# Patient Record
Sex: MALE | Race: WHITE | NOT HISPANIC OR LATINO | Employment: UNEMPLOYED | URBAN - METROPOLITAN AREA
[De-identification: names, ages, dates, MRNs, and addresses within clinical notes are randomized per-mention and may not be internally consistent; named-entity substitution may affect disease eponyms.]

---
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
## 2019-01-08 ENCOUNTER — RECORDS - HEALTHEAST (OUTPATIENT)
Dept: ADMINISTRATIVE | Facility: OTHER | Age: 19
End: 2019-01-08

## 2019-07-26 ENCOUNTER — AMBULATORY - HEALTHEAST (OUTPATIENT)
Dept: LAB | Facility: CLINIC | Age: 19
End: 2019-07-26

## 2019-07-26 DIAGNOSIS — Z79.899 ENCOUNTER FOR LONG-TERM (CURRENT) USE OF MEDICATIONS: ICD-10-CM

## 2019-07-26 DIAGNOSIS — G40.019 LOCALIZATION-RELATED (FOCAL) (PARTIAL) IDIOPATHIC EPILEPSY AND EPILEPTIC SYNDROMES WITH SEIZURES OF LOCALIZED ONSET, INTRACTABLE, WITHOUT STATUS EPILEPTICUS (H): ICD-10-CM

## 2019-08-27 ENCOUNTER — COMMUNICATION - HEALTHEAST (OUTPATIENT)
Dept: SCHEDULING | Facility: CLINIC | Age: 19
End: 2019-08-27

## 2019-08-28 ENCOUNTER — RECORDS - HEALTHEAST (OUTPATIENT)
Dept: ADMINISTRATIVE | Facility: OTHER | Age: 19
End: 2019-08-28

## 2019-08-29 ENCOUNTER — COMMUNICATION - HEALTHEAST (OUTPATIENT)
Dept: FAMILY MEDICINE | Facility: CLINIC | Age: 19
End: 2019-08-29

## 2019-09-05 ENCOUNTER — COMMUNICATION - HEALTHEAST (OUTPATIENT)
Dept: FAMILY MEDICINE | Facility: CLINIC | Age: 19
End: 2019-09-05

## 2019-09-09 ENCOUNTER — COMMUNICATION - HEALTHEAST (OUTPATIENT)
Dept: FAMILY MEDICINE | Facility: CLINIC | Age: 19
End: 2019-09-09

## 2019-09-12 ENCOUNTER — COMMUNICATION - HEALTHEAST (OUTPATIENT)
Dept: FAMILY MEDICINE | Facility: CLINIC | Age: 19
End: 2019-09-12

## 2020-03-23 ENCOUNTER — RECORDS - HEALTHEAST (OUTPATIENT)
Dept: ADMINISTRATIVE | Facility: OTHER | Age: 20
End: 2020-03-23

## 2020-03-23 ENCOUNTER — AMBULATORY - HEALTHEAST (OUTPATIENT)
Dept: LAB | Facility: CLINIC | Age: 20
End: 2020-03-23

## 2020-03-23 DIAGNOSIS — Z79.899 ENCOUNTER FOR LONG-TERM (CURRENT) USE OF MEDICATIONS: ICD-10-CM

## 2020-03-23 DIAGNOSIS — G40.019 LOCALIZATION-RELATED IDIOPATHIC EPILEPSY AND EPILEPTIC SYNDROMES WITH SEIZURES OF LOCALIZED ONSET, INTRACTABLE, WITHOUT STATUS EPILEPTICUS (H): ICD-10-CM

## 2020-05-21 ENCOUNTER — OFFICE VISIT - HEALTHEAST (OUTPATIENT)
Dept: FAMILY MEDICINE | Facility: CLINIC | Age: 20
End: 2020-05-21

## 2020-05-21 DIAGNOSIS — Z00.00 HEALTH CARE MAINTENANCE: ICD-10-CM

## 2020-05-21 DIAGNOSIS — F84.0 AUTISM: ICD-10-CM

## 2020-05-21 DIAGNOSIS — R56.9 SEIZURES (H): ICD-10-CM

## 2020-05-21 ASSESSMENT — PATIENT HEALTH QUESTIONNAIRE - PHQ9: SUM OF ALL RESPONSES TO PHQ QUESTIONS 1-9: 0

## 2020-05-22 ENCOUNTER — COMMUNICATION - HEALTHEAST (OUTPATIENT)
Dept: FAMILY MEDICINE | Facility: CLINIC | Age: 20
End: 2020-05-22

## 2020-08-31 ENCOUNTER — COMMUNICATION - HEALTHEAST (OUTPATIENT)
Dept: FAMILY MEDICINE | Facility: CLINIC | Age: 20
End: 2020-08-31

## 2020-09-01 ENCOUNTER — COMMUNICATION - HEALTHEAST (OUTPATIENT)
Dept: FAMILY MEDICINE | Facility: CLINIC | Age: 20
End: 2020-09-01

## 2020-09-23 ENCOUNTER — COMMUNICATION - HEALTHEAST (OUTPATIENT)
Dept: FAMILY MEDICINE | Facility: CLINIC | Age: 20
End: 2020-09-23

## 2020-09-24 ENCOUNTER — RECORDS - HEALTHEAST (OUTPATIENT)
Dept: ADMINISTRATIVE | Facility: OTHER | Age: 20
End: 2020-09-24

## 2021-01-28 ENCOUNTER — COMMUNICATION - HEALTHEAST (OUTPATIENT)
Dept: FAMILY MEDICINE | Facility: CLINIC | Age: 21
End: 2021-01-28

## 2021-02-10 ENCOUNTER — COMMUNICATION - HEALTHEAST (OUTPATIENT)
Dept: FAMILY MEDICINE | Facility: CLINIC | Age: 21
End: 2021-02-10

## 2021-05-27 ASSESSMENT — PATIENT HEALTH QUESTIONNAIRE - PHQ9: SUM OF ALL RESPONSES TO PHQ QUESTIONS 1-9: 0

## 2021-05-30 ENCOUNTER — HEALTH MAINTENANCE LETTER (OUTPATIENT)
Age: 21
End: 2021-05-30

## 2021-05-30 VITALS — HEIGHT: 66 IN | WEIGHT: 125 LBS | BODY MASS INDEX: 20.09 KG/M2

## 2021-05-31 VITALS — WEIGHT: 121 LBS | HEIGHT: 66 IN | BODY MASS INDEX: 19.44 KG/M2

## 2021-05-31 NOTE — TELEPHONE ENCOUNTER
"Triage call:     Consent on file- with patient at home    Yesterday patient had a large grand mal seizure that mom reports was bigger than any seizure that patient has had before. She reports that when she got to him he was \"a little blue\". Happened around 11 am yesterday.      Last night patient spiked a fever and started coughing- dry. Mom states that she only measured temp by touch due to his autism and concerns he would break the thermometer. Mom states that fever is continued today. Mom has also noted Congested in his nose- using saline spray. She has also noted that he is very thirsty today. Unsure if patient has a headache.     Mom reports that this is the first that patient has gotten up since the seizure yesterday- they slept most of the day.     Triaged to be seen in the office today- no appointments available with PCP till tomorrow. PCP please advise- is this something that patient should wait until tomorrow for or something that he should be seen for more urgently? Please advise.     Appointment scheduled for tomorrow @ 1:30 with PCP.     Sussy Hale RN [facility] Care Connection Triage/Med Refill 8/27/2019 10:58 AM    Reason for Disposition    Patient wants to be seen    Protocols used: [id number]      "

## 2021-06-01 VITALS — WEIGHT: 138 LBS | HEIGHT: 66 IN | BODY MASS INDEX: 22.18 KG/M2

## 2021-06-01 NOTE — TELEPHONE ENCOUNTER
Name of form/paperwork: Other:  request to administer medication  Have you been seen for this request: Yes:  in chart  Do we have the form: Yes- 9-5-2019  When is form needed by: ASAP  How would you like the form returned: just file in my chart  Fax Number: [phone]  Patient Notified form requests are processed in 3-5 business days: Yes  (If patient needs form sooner, please note that in this message.)  Okay to leave a detailed message? Yes    Form placed in purple [employer] bin    Please fax to [phone], then file in chart

## 2021-06-01 NOTE — TELEPHONE ENCOUNTER
Who is requesting the letter?  Patient's motherMaryjane  Why is the letter needed?   Patient's mother states patient needs a letter stating exact dosage of Diastat Acudial to administer.  Patient's mother states the letter should state: 3.5 mg after three minute seizure.  How would you like this letter returned? Please fax to [facility], [phone]  Okay to leave a detailed message? Yes

## 2021-06-08 NOTE — TELEPHONE ENCOUNTER
Left message to call back for: Forms   Information to relay to patient:  Left message forms are complete. Would they like forms faxed? If so needing a fax #, and where we are sending. Otherwise can  at clinic or mail.    Copies made and sent to scanning.

## 2021-06-08 NOTE — PROGRESS NOTES
"Jg Jordan is a 19 y.o. male who is being evaluated via a billable video visit.      The patient has been notified of following:     \"This video visit will be conducted via a call between you and your physician/provider. We have found that certain health care needs can be provided without the need for an in-person physical exam.  This service lets us provide the care you need with a video conversation.  If a prescription is necessary we can send it directly to your pharmacy.  If lab work is needed we can place an order for that and you can then stop by our lab to have the test done at a later time.    Video visits are billed at different rates depending on your insurance coverage. Please reach out to your insurance provider with any questions.    If during the course of the call the physician/provider feels a video visit is not appropriate, you will not be charged for this service.\"    Patient has given verbal consent to a Video visit? Yes    Patient would like to receive their AVS by AVS Preference: Nini.    Patient would like the video invitation sent by: Send to e-mail at: [email or web address]  Will anyone else be joining your video visit? No        Video Start Time: 1115 am        Video-Visit Details  ASSESSMENT/PLAN  1. Health care maintenance  Patient doing well overall is in need of forms for new day program he will be starting    2. Autism  No changes to medication he continues to follow with neurology    3. Seizures (H)  Has been doing well and continues to follow with neurology no medication changes        SUBJECTIVE:   Chief Complaint   Patient presents with     Forms Request     day program forms      Jg Jordan 19 y.o. male    Current Outpatient Medications   Medication Sig Dispense Refill     diazePAM (DIASTAT ACUDIAL) 5-7.5-10 mg Kit        guanFACINE (TENEX) 1 MG tablet Take 1 mg by mouth 2 (two) times a day.        melatonin 3 mg Tab tablet Take 3 mg by mouth bedtime as needed. "       sertraline (ZOLOFT) 100 MG tablet Take 200 mg by mouth daily. AM only       topiramate (TOPAMAX) 25 MG tablet Take 25 mg by mouth.        traZODone (DESYREL) 100 MG tablet Take 100 mg by mouth bedtime.       UNABLE TO FIND Med Name:   Michelle Araya  .47% CBD  .45% THZ  Take 10mL in AM and 10 mL in PM       No current facility-administered medications for this visit.      Allergies: Patient has no known allergies.   No LMP for male patient.    HPI:   Patient seen virtually today due to COVID pandemic for forms to be filled out for his day program.  We will fill out forms and fax for patient.  They have no acute concerns.  Patient has been self isolating well with his family at home.  Patient has a neurologist that follows with him for his history of seizures and autism.  There is been no recent medication changes.  We will update forms and send to his new facility where he will be starting in the upcoming month.    ROS: negative except as per HPI    OBJECTIVE:   The patient appears well, alert  There were no vitals taken for this visit.  GENERAL: Healthy, alert and no distress  EYES: Eyes grossly normal to inspection. No discharge or erythema, or obvious scleral/conjunctival abnormalities.  RESP: No audible wheeze, cough, or visible cyanosis.  No visible retractions or increased work of breathing.    NEURO: Cranial nerves grossly intact.    Pt states an understanding and agrees to the above plan.    Type of service:  Video Visit    Video End Time (time video stopped): 11:23 AM  Originating Location (pt. Location): Home    Distant Location (provider location):  [facility] MEDICINE/OB     Platform used for Video Visit: Murali Rousseau MD

## 2021-06-10 NOTE — PROGRESS NOTES
SUBJECTIVE:   Chief Complaint   Patient presents with     Weight Loss     Decrease in appetite, behavior change, weight loss     Jg TORRES lucia 16 y.o. male    Current Outpatient Prescriptions   Medication Sig Dispense Refill     diazepam (DIASTAT ACUDIAL) 5-7.5-10 mg Kit Insert into the rectum as needed.       guanFACINE (TENEX) 1 MG tablet Take 1 mg by mouth 2 (two) times a day.        melatonin 3 mg Tab tablet Take 3 mg by mouth bedtime as needed.       sertraline (ZOLOFT) 100 MG tablet Take 150 mg by mouth daily. AM only       traZODone (DESYREL) 100 MG tablet Take 100 mg by mouth bedtime.       UNABLE TO FIND Med Name:   Michelle Oil  .47% CBD  .45% THZ  Take 10mL in AM and 10 mL in PM       UNABLE TO FIND as needed. Med Name: Tangerine Sublingual spray   100% pure cannabis oil - THC Dominant       No current facility-administered medications for this visit.      Allergies: Review of patient's allergies indicates no known allergies.   No LMP for male patient.    HPI:   Patient presents today with his mother.  Their concerns as he has had some decrease in appetite and eating habits over the last couple months.  They have also noticed in the last month some behavioral change.  He has been acting normal slightly more aggressive which is atypical for him in the last month.  To note in review with mom there is been some titration off medications for his seizures in the last few months including the discontinuation of both topiramate and lamotrigine.  He is on these medications for seizure prophylaxis and has not had a seizure since October-he is currently also taking oral suspension medical marijuana through the [facility] and this is improved his seizure threshold and frequency greatly.  We did have discussion with mom today that the removal of these medications however could be leading to the change of his behavior as lamotrigine is also used for behavioral reasons not just seizures.  She does after discussion  "think that the time he does correlate with removal of his medications and some of the behavioral changes.    He has however down 6-7 pounds in comparison to a few months ago and she has noticed him to be eating a little bit less.  We had a discussion today over food journaling over the next couple weeks to get me information to see what his caloric intake actually has an following up based on this model.  If she continues to see behavioral changes consideration for discussing with the neurologist about the removal of the lamotrigine and behavioral changes.    ROS: negative except as per HPI    OBJECTIVE:   The patient appears well, alert, oriented x 3, in no distress.  BP 94/56 (Patient Site: Right Arm, Patient Position: Sitting, Cuff Size: Adult Regular)  Pulse 88  Temp 98  F (36.7  C) (Oral)   Resp 18  Ht 5' 6\" (1.676 m)  Wt 125 lb (56.7 kg)  BMI 20.18 kg/m2    Cardiac: S1 and S2 normal, no murmurs, regular rate and rhythm.   Neurological: normal, no focal findings.  Skin: clear, dry, no rashes/lesions  Psych- normal mood and affect- sitting and watching phone throughout inerview      ASSESSMENT/PLAN  1. Weight loss  Food journaling have a mild drop off information to me so I can assess caloric intake to see if this correlates with weight loss  Follow based on food journaling    2. Behavioral change  Possible removal of seizure medications let us behavioral changes  Has not been something on a regular basis but some that she wanted to bring up and discuss  We will continue to monitor at this time and she will update me in a few weeks      Pt states an understanding and agrees to the above plan.    "

## 2021-06-12 NOTE — PROGRESS NOTES
ASSESSMENT AND PLAN:  COUGH - TX WITH OLGA, ISOLATE COUGH FROM OTHERS. Concern for pertussis, but declined pertussis swab today as it would be very traumatic given the he is nonverbal and developmentally delayed.  If his cough persists we may need to empirically treat him for suspected pertussis with oral steroids etc. if he is worsening they are advised to go to the emergency room.  If he is not improving or continues to have problems he should call the clinic.    Chief Complaint   Patient presents with     Cough     Patient has a bad cough for the last several days. He will cough so hard that he chokes and gasps for air.  Someone in his class at school was diagnosed with strep.  No known fever.     HPI  Jg Jordan is a 17 y.o. male comes in with his mother and his sister for a cough that is been going on for about a week and was really bad yesterday.  He has been coughing to the point of throwing up and sometimes even he seems to have trouble breathing when he is coughing so hard.  Fortunately he is doing well right now and is not having any problems breathing or coughing at this point.    History   Smoking Status     Never Smoker   Smokeless Tobacco     Not on file      Current Outpatient Prescriptions   Medication Sig Dispense Refill     diazepam (DIASTAT ACUDIAL) 5-7.5-10 mg Kit Insert into the rectum as needed.       guanFACINE (TENEX) 1 MG tablet Take 1 mg by mouth 2 (two) times a day.        melatonin 3 mg Tab tablet Take 3 mg by mouth bedtime as needed.       sertraline (ZOLOFT) 100 MG tablet Take 150 mg by mouth daily. AM only       topiramate (TOPAMAX) 25 MG tablet Take 200 mg by mouth.       traZODone (DESYREL) 100 MG tablet Take 100 mg by mouth bedtime.       UNABLE TO FIND Med Name:   Michelle Araya  .47% CBD  .45% THZ  Take 10mL in AM and 10 mL in PM       azithromycin (ZITHROMAX) 200 mg/5 mL suspension 500mg orally on day 1 and then 250 mg on days 2-5 then stop. 40 mL 0     No current  "facility-administered medications for this visit.      No Known Allergies  Review of Systems   Constitutional: Negative.    HENT: Negative.    Eyes: Negative.    Respiratory: Negative.    Cardiovascular: Negative.    Gastrointestinal: Negative.    Endocrine: Negative.    Genitourinary: Negative.    Musculoskeletal: Negative.    Skin: Negative.    Neurological: Negative.    Hematological: Negative.    Psychiatric/Behavioral: Negative.         OBJECTIVE: /60  Pulse 84  Temp 97.9  F (36.6  C) (Oral)   Resp 20  Ht 5' 6\" (1.676 m)  Wt 121 lb (54.9 kg)  SpO2 96%  BMI 19.53 kg/m2  Physical Exam   Constitutional: He appears well-developed and well-nourished. No distress.   Nonverbal   HENT:   Head: Normocephalic.   Right Ear: External ear normal.   Left Ear: External ear normal.   Nose: Mucosal edema and rhinorrhea present. Right sinus exhibits maxillary sinus tenderness and frontal sinus tenderness. Left sinus exhibits maxillary sinus tenderness and frontal sinus tenderness.   Mouth/Throat: Oropharynx is clear and moist.   Exam is difficult to do because patient is nonverbal and   Cardiovascular: Normal rate, regular rhythm and normal heart sounds.    Pulmonary/Chest: Effort normal and breath sounds normal. No respiratory distress. He has no wheezes. He exhibits no tenderness.   Abdominal: Soft. Bowel sounds are normal. He exhibits no distension. There is no tenderness.   Skin: Skin is warm and dry. He is not diaphoretic.   Psychiatric:   Nonverbal       Pertussis swab recommended but declined because mom thought this would be too traumatic.     "

## 2021-06-19 NOTE — PROGRESS NOTES
[facility] Well Child Check    ASSESSMENT & PLAN  Jg Jordan is a 18 y.o. who has normal growth and normal development.    Diagnoses and all orders for this visit:    Encounter for routine child health examination without abnormal findings  No acute concerns on exam- no labs today  Autism  Follows with specialty- no concerns at this time- forms filled out for camp  No acute findings of concern  Pt cooperates with exam, enjoys screen time  Seizures (H)  Has been doing better with less frequency and severity of seizures with medical THC  Other orders  -     Vision Screening      Return to clinic in 1 year for a Well Child Check or sooner as needed    IMMUNIZATIONS/LABS  Immunizations were reviewed and orders were placed as appropriate. and No immunizations due today.    REFERRALS  Dental:  Recommend routine dental care as appropriate.  Other:  No additional referrals were made at this time.    ANTICIPATORY GUIDANCE  I have reviewed age appropriate anticipatory guidance.  Social:  Friends, Employment and Extracurricular Activities  Parenting:  Musella/Dependence  Nutrition:  Junk Food  Health:  Sleep and Dental Care  Safety:  Bike/Motorcycle Helmets    HEALTH HISTORY  Do you have any concerns that you'd like to discuss today?: No concerns       Roomed by: Mignon AUSTIN    Accompanied by Mother Jamaal    Refills needed? No    Do you have any forms that need to be filled out? Yes        Do you have any significant health concerns in your family history?: No  No family history on file.  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   What grade are you in?:  Graduated high school last year   How do you perform in school (grades, behavior, attention, homework?: n/a     Eating  Do you eat regular meals including fruits and vegetables?:  yes  What are you drinking (cow's milk, water, soda, juice, sports drinks, energy drinks, etc)?: water, juice and chocolate milk  Have you been worried that you don't have enough food?: No  Do you have concerns about your body or appearance?:  No    Activities  Do you have friends?:  yes  Do you get at least one hour of physical activity per day?:  yes, not every day, 2-3 times a week going to a    How many hours a day are you in front of a screen other than for schoolwork (computer, TV, phone)?:  4-5  What do you do for exercise?:  Walking the dog, going to a  2-3 times a week   Do you have interest/participate in community activities/volunteers/school sports?:  no    MENTAL HEALTH SCREENING  No Data Recorded  No Data Recorded    VISION/HEARING  Vision: Completed. See Results  Hearing:  Completed. See Results     Visual Acuity Screening    Right eye Left eye Both eyes   Without correction: 10/10 10/10    With correction:      Comments: Plus Lens: Pass: blurring of vision with +2.50 lens glasses    Hearing Screening Comments: Attempted - did not understand test     TB Risk Assessment:  The patient and/or parent/guardian answer positive to:  patient and/or parent/guardian answer 'no' to all screening TB questions    Dyslipidemia Risk Screening  Have either of your parents or any of your grandparents had a stroke or heart attack before age 55?: No  Any parents with high cholesterol or currently taking medications to treat?: No     Dental  When was the last time you saw the dentist?: 0-3 months ago   Parent/Guardian declines the fluoride  "varnish application today.    Patient Active Problem List   Diagnosis     Seizures (H)     Autism       Drugs  Does the patient use tobacco/alcohol/drugs?:  no    Safety  Does the patient have any safety concerns (peer or home)?:  no  Does the patient use safety belts, helmets and other safety equipment?:  yes    Sex  Have you ever had sex?:  No    MEASUREMENTS  Height:  5' 6\" (1.676 m)  Weight: 138 lb (62.6 kg)  BMI: Body mass index is 22.27 kg/(m^2).  Blood Pressure: (!) 84/50  Blood pressure percentiles are <1 % systolic and 2 % diastolic based on NHBPEP's 4th Report. Blood pressure percentile targets: 90: 132/88, 95: 136/92, 99 + 5 mmH/105.    PHYSICAL EXAM  Physical Examination: General appearance - alert, well appearing, and in no distress  Mental status - appropriate with exam, cooperates with exam, pleasant  Eyes - pupils equal and reactive, extraocular eye movements intact  Ears - bilateral TM's and external ear canals normal  Nose - normal and patent, no erythema, discharge or polyps  Mouth - mucous membranes moist, pharynx normal without lesions  Lymphatics - no palpable lymphadenopathy, no hepatosplenomegaly  Chest - clear to auscultation, no wheezes, rales or rhonchi, symmetric air entry  Heart - normal rate, regular rhythm, normal S1, S2, no murmurs, rubs, clicks or gallops  Abdomen - soft, nontender, nondistended, no masses or organomegaly  Back exam - full range of motion, no tenderness, palpable spasm or pain on motion  Neurological - alert, oriented, no focal findings or movement disorder noted  Musculoskeletal - no joint tenderness, deformity or swelling  Extremities - peripheral pulses normal, no pedal edema, no clubbing or cyanosis  Skin - normal coloration and turgor, no rashes, no suspicious skin lesions noted    "

## 2021-06-19 NOTE — LETTER
Letter by Agusto Rousseau MD at      Author: Agusto Rousseau MD Service: -- Author Type: --    Filed:  Encounter Date: 9/12/2019 Status: (Other)       To whom it may concern:    Jg Lacy, date of birth [birth year], is under my care at clinic.  Medication Diastat is to be given after 3 minute seizure- 7.5mg to each buccal area.  If there are questions in regards to this please contact me at clinic.  [phone].    Sincerely,   Dr. Agusto Rousseau

## 2021-06-19 NOTE — LETTER
Letter by Agusto Rousseau MD at      Author: Agusto Rousseau MD Service: -- Author Type: --    Filed:  Encounter Date: 2019 Status: (Other)         Jg Jordan  [address] [place] [address]      2019      Dear Jg Jordan,   : 2000      This letter is in regards to the appointment that you had scheduled on 2019 at the [facility] with Dr. Rousseau.     The [facility] strives to see all patients in a timely manner and we need your help to achieve this.  The above-mentioned appointment was missed and we do not have record of a cancellation by you.  Whenever possible, we request appointment cancellations at least 72 hours in advance.  This time allows us to offer the appointment to another patient in need.      If you feel you have received this letter in error, or if you need to reschedule this appointment, please call our office so that we may update our records.      Sincerely,    [facility]

## 2021-06-20 NOTE — LETTER
Letter by Agusto Rousseau MD at      Author: Agusto Rousseau MD Service: -- Author Type: --    Filed:  Encounter Date: 9/1/2020 Status: (Other)         To whom it may concern:    Jg Jordan, date of birth 2000, is under my care clinic.  Patient has been using a  at the gym to continue to work on balance, coordination, cardiovascular strengthening and overall health.  I would recommend that this continue for his overall health, strength, and bone density.  If there are questions in regard to this please contact me at clinic: [phone].    Sincerely,    Dr. Agusto Rousseau

## 2021-09-19 ENCOUNTER — HEALTH MAINTENANCE LETTER (OUTPATIENT)
Age: 21
End: 2021-09-19

## 2022-06-25 ENCOUNTER — HEALTH MAINTENANCE LETTER (OUTPATIENT)
Age: 22
End: 2022-06-25

## 2022-11-20 ENCOUNTER — HEALTH MAINTENANCE LETTER (OUTPATIENT)
Age: 22
End: 2022-11-20

## 2022-11-21 ENCOUNTER — OFFICE VISIT (OUTPATIENT)
Dept: FAMILY MEDICINE | Facility: CLINIC | Age: 22
End: 2022-11-21
Payer: COMMERCIAL

## 2022-11-21 VITALS
HEART RATE: 82 BPM | RESPIRATION RATE: 20 BRPM | SYSTOLIC BLOOD PRESSURE: 131 MMHG | BODY MASS INDEX: 27.15 KG/M2 | HEIGHT: 67 IN | TEMPERATURE: 98.1 F | OXYGEN SATURATION: 99 % | WEIGHT: 173 LBS | DIASTOLIC BLOOD PRESSURE: 79 MMHG

## 2022-11-21 DIAGNOSIS — K21.00 GASTROESOPHAGEAL REFLUX DISEASE WITH ESOPHAGITIS WITHOUT HEMORRHAGE: Primary | ICD-10-CM

## 2022-11-21 PROCEDURE — 99213 OFFICE O/P EST LOW 20 MIN: CPT | Performed by: FAMILY MEDICINE

## 2022-11-21 RX ORDER — SERTRALINE HYDROCHLORIDE 100 MG/1
200 TABLET, FILM COATED ORAL DAILY
COMMUNITY
Start: 2022-06-14

## 2022-11-21 RX ORDER — GUANFACINE 2 MG/1
2 TABLET ORAL
COMMUNITY
Start: 2022-06-14

## 2022-11-21 RX ORDER — FAMOTIDINE 20 MG/1
20 TABLET, FILM COATED ORAL 2 TIMES DAILY
Qty: 60 TABLET | Refills: 1 | Status: SHIPPED | OUTPATIENT
Start: 2022-11-21 | End: 2023-02-24

## 2022-11-21 RX ORDER — DIAZEPAM 10 MG
10 TABLET ORAL
COMMUNITY

## 2022-11-21 NOTE — PROGRESS NOTES
"  Assessment & Plan     Gastroesophageal reflux disease with esophagitis without hemorrhage  Suspect acid reflux contributing to patient's chest symptoms and complaints at home  Patient has gained some weight  Discussed lifestyle modification with parents  Discussed starting a trial on Pepcid  Discussed monitoring food and water intake and fluid intake to see if there is any triggers that they are noting when he has concerns or complaints of the chest discomfort  Update me in 2 weeks with change of symptoms  - famotidine (PEPCID) 20 MG tablet; Take 1 tablet (20 mg) by mouth 2 times daily         BMI:   Estimated body mass index is 27.41 kg/m  as calculated from the following:    Height as of this encounter: 1.692 m (5' 6.61\").    Weight as of this encounter: 78.5 kg (173 lb).       No follow-ups on file.    Agusto Rousseau MD  [facility]    Riley Gregorio is a 22 year old accompanied by his mother and father, presenting for the following health issues:  No chief complaint on file.  22-year-old male here with his parents today-history of autism.  Patient has been complaining to his parents periodically in the last few weeks about chest discomfort-they are noticing this around times after he has eaten-he has gained some weight and we discussed the possibility that he is not noting some acid reflux.  We discussed a trial on Pepcid.  We discussed food journaling to try to see if they notice any patterns that are contributing to his possible suspected acid reflux.  Discussed diet and exercise to help lower risk of acid reflux.  They will update me in 2 weeks with change of symptoms.  Consider PPI if no changes with Pepcid.    History of Present Illness       Reason for visit:  Weight  chest pain head aches    He eats 2-3 servings of fruits and vegetables daily.He consumes 2 sweetened beverage(s) daily.He exercises with enough effort to increase his heart rate 9 or less minutes per " "day.  He exercises with enough effort to increase his heart rate 3 or less days per week.   He is taking medications regularly.                   Objective    /79 (BP Location: Left arm, Patient Position: Sitting, Cuff Size: Adult Regular)   Pulse 82   Temp 98.1  F (36.7  C) (Oral)   Resp 20   Ht 1.692 m (5' 6.61\")   Wt 78.5 kg (173 lb)   SpO2 99%   BMI 27.41 kg/m    Body mass index is 27.41 kg/m .  Physical Exam   GENERAL: healthy, alert and no distress  EYES: Eyes grossly normal to inspection, PERRL and conjunctivae and sclerae normal  RESP: lungs clear to auscultation - no rales, rhonchi or wheezes  CV: regular rate and rhythm, normal S1 S2, no S3 or S4, no murmur, click or rub, no peripheral edema and peripheral pulses strong  ABDOMEN: soft, nontender, no hepatosplenomegaly, no masses and bowel sounds normal  MS: no gross musculoskeletal defects noted, no edema  PSYCH: mentation appears normal, affect normal/bright                    "

## 2023-01-24 ENCOUNTER — MYC MEDICAL ADVICE (OUTPATIENT)
Dept: FAMILY MEDICINE | Facility: CLINIC | Age: 23
End: 2023-01-24
Payer: COMMERCIAL

## 2023-01-24 DIAGNOSIS — Z00.00 HEALTH CARE MAINTENANCE: Primary | ICD-10-CM

## 2023-01-26 NOTE — TELEPHONE ENCOUNTER
Dr. Rousseau,    I don't see reference to blood work in recent visit?    Jake Ariza RN     [facility]

## 2023-02-23 DIAGNOSIS — K21.00 GASTROESOPHAGEAL REFLUX DISEASE WITH ESOPHAGITIS WITHOUT HEMORRHAGE: ICD-10-CM

## 2023-02-24 RX ORDER — FAMOTIDINE 20 MG/1
TABLET, FILM COATED ORAL
Qty: 180 TABLET | Refills: 2 | Status: SHIPPED | OUTPATIENT
Start: 2023-02-24

## 2023-02-24 NOTE — TELEPHONE ENCOUNTER
"Last Written Prescription Date:  11/21/22  Last Fill Quantity: 60,  # refills: 1   Last office visit provider:  11/21/22     Requested Prescriptions   Pending Prescriptions Disp Refills     famotidine (PEPCID) 20 MG tablet [Pharmacy Med Name: Famotidine Oral Tablet 20 MG] 60 tablet 0     Sig: TAKE ONE TABLET BY MOUTH TWICE DAILY       H2 Blockers Protocol Passed - 2/23/2023 10:17 AM        Passed - Patient is age 12 or older        Passed - Recent (12 mo) or future (30 days) visit within the authorizing provider's specialty     Patient has had an office visit with the authorizing provider or a provider within the authorizing providers department within the previous 12 mos or has a future within next 30 days. See \"Patient Info\" tab in inbasket, or \"Choose Columns\" in Meds & Orders section of the refill encounter.              Passed - Medication is active on med list             Melly Lyn RN 02/24/23 2:41 PM  "

## 2023-04-26 ENCOUNTER — LAB (OUTPATIENT)
Dept: LAB | Facility: CLINIC | Age: 23
End: 2023-04-26
Payer: COMMERCIAL

## 2023-04-26 DIAGNOSIS — Z00.00 HEALTH CARE MAINTENANCE: ICD-10-CM

## 2023-04-26 LAB
ALBUMIN SERPL BCG-MCNC: 4.2 G/DL (ref 3.5–5.2)
ALP SERPL-CCNC: 72 U/L (ref 40–129)
ALT SERPL W P-5'-P-CCNC: 24 U/L (ref 10–50)
ANION GAP SERPL CALCULATED.3IONS-SCNC: 11 MMOL/L (ref 7–15)
AST SERPL W P-5'-P-CCNC: 26 U/L (ref 10–50)
BILIRUB SERPL-MCNC: 0.2 MG/DL
BUN SERPL-MCNC: 10.2 MG/DL (ref 6–20)
CALCIUM SERPL-MCNC: 8 MG/DL (ref 8.6–10)
CHLORIDE SERPL-SCNC: 91 MMOL/L (ref 98–107)
CHOLEST SERPL-MCNC: 200 MG/DL
CREAT SERPL-MCNC: 0.83 MG/DL (ref 0.67–1.17)
DEPRECATED HCO3 PLAS-SCNC: 19 MMOL/L (ref 22–29)
ERYTHROCYTE [DISTWIDTH] IN BLOOD BY AUTOMATED COUNT: 12.6 % (ref 10–15)
GFR SERPL CREATININE-BSD FRML MDRD: >90 ML/MIN/1.73M2
GLUCOSE SERPL-MCNC: 80 MG/DL (ref 70–99)
HCT VFR BLD AUTO: 41.3 % (ref 40–53)
HDLC SERPL-MCNC: 24 MG/DL
HGB BLD-MCNC: 14.7 G/DL (ref 13.3–17.7)
LDLC SERPL CALC-MCNC: 123 MG/DL
MCH RBC QN AUTO: 31.3 PG (ref 26.5–33)
MCHC RBC AUTO-ENTMCNC: 35.6 G/DL (ref 31.5–36.5)
MCV RBC AUTO: 88 FL (ref 78–100)
NONHDLC SERPL-MCNC: 176 MG/DL
PLATELET # BLD AUTO: 217 10E3/UL (ref 150–450)
POTASSIUM SERPL-SCNC: 3.7 MMOL/L (ref 3.4–5.3)
PROT SERPL-MCNC: 6 G/DL (ref 6.4–8.3)
RBC # BLD AUTO: 4.7 10E6/UL (ref 4.4–5.9)
SODIUM SERPL-SCNC: 121 MMOL/L (ref 136–145)
TRIGL SERPL-MCNC: 266 MG/DL
WBC # BLD AUTO: 9.3 10E3/UL (ref 4–11)

## 2023-04-26 PROCEDURE — 36415 COLL VENOUS BLD VENIPUNCTURE: CPT

## 2023-04-26 PROCEDURE — 80053 COMPREHEN METABOLIC PANEL: CPT

## 2023-04-26 PROCEDURE — 80061 LIPID PANEL: CPT

## 2023-04-26 PROCEDURE — 85027 COMPLETE CBC AUTOMATED: CPT

## 2023-04-28 DIAGNOSIS — E87.1 HYPONATREMIA: Primary | ICD-10-CM

## 2023-05-01 ENCOUNTER — LAB (OUTPATIENT)
Dept: LAB | Facility: CLINIC | Age: 23
End: 2023-05-01
Payer: COMMERCIAL

## 2023-05-01 DIAGNOSIS — E87.1 HYPONATREMIA: ICD-10-CM

## 2023-05-01 LAB
ANION GAP SERPL CALCULATED.3IONS-SCNC: 13 MMOL/L (ref 7–15)
BUN SERPL-MCNC: 9.5 MG/DL (ref 6–20)
CALCIUM SERPL-MCNC: 9.8 MG/DL (ref 8.6–10)
CHLORIDE SERPL-SCNC: 105 MMOL/L (ref 98–107)
CREAT SERPL-MCNC: 0.9 MG/DL (ref 0.67–1.17)
DEPRECATED HCO3 PLAS-SCNC: 22 MMOL/L (ref 22–29)
GFR SERPL CREATININE-BSD FRML MDRD: >90 ML/MIN/1.73M2
GLUCOSE SERPL-MCNC: 98 MG/DL (ref 70–99)
POTASSIUM SERPL-SCNC: 3.8 MMOL/L (ref 3.4–5.3)
SODIUM SERPL-SCNC: 140 MMOL/L (ref 136–145)

## 2023-05-01 PROCEDURE — 80048 BASIC METABOLIC PNL TOTAL CA: CPT

## 2023-05-01 PROCEDURE — 36415 COLL VENOUS BLD VENIPUNCTURE: CPT

## 2023-05-04 ENCOUNTER — TELEPHONE (OUTPATIENT)
Dept: FAMILY MEDICINE | Facility: CLINIC | Age: 23
End: 2023-05-04
Payer: COMMERCIAL

## 2023-05-04 NOTE — TELEPHONE ENCOUNTER
----- Message from Agusto Rousseau MD sent at 5/3/2023  8:29 AM CDT -----  Please contact patient's mother:  Sodium levels are completely normal.  I suspect that the blood hemolyzed causing the abnormality last week.  No follow-up is needed.

## 2023-07-08 ENCOUNTER — HEALTH MAINTENANCE LETTER (OUTPATIENT)
Age: 23
End: 2023-07-08

## 2023-07-25 PROBLEM — Z87.898 HISTORY OF SEIZURES: Status: ACTIVE | Noted: 2023-07-25

## 2024-08-31 ENCOUNTER — HEALTH MAINTENANCE LETTER (OUTPATIENT)
Age: 24
End: 2024-08-31

## 2025-02-05 ENCOUNTER — OFFICE VISIT (OUTPATIENT)
Dept: FAMILY MEDICINE | Facility: CLINIC | Age: 25
End: 2025-02-05
Payer: COMMERCIAL

## 2025-02-05 VITALS
SYSTOLIC BLOOD PRESSURE: 121 MMHG | HEART RATE: 70 BPM | OXYGEN SATURATION: 98 % | HEIGHT: 66 IN | TEMPERATURE: 98.1 F | WEIGHT: 192 LBS | DIASTOLIC BLOOD PRESSURE: 76 MMHG | BODY MASS INDEX: 30.86 KG/M2

## 2025-02-05 DIAGNOSIS — F84.0 ACTIVE AUTISTIC DISORDER: ICD-10-CM

## 2025-02-05 DIAGNOSIS — E66.811 CLASS 1 OBESITY DUE TO EXCESS CALORIES WITH SERIOUS COMORBIDITY AND BODY MASS INDEX (BMI) OF 30.0 TO 30.9 IN ADULT: ICD-10-CM

## 2025-02-05 DIAGNOSIS — E66.09 CLASS 1 OBESITY DUE TO EXCESS CALORIES WITH SERIOUS COMORBIDITY AND BODY MASS INDEX (BMI) OF 30.0 TO 30.9 IN ADULT: ICD-10-CM

## 2025-02-05 DIAGNOSIS — Z00.00 HEALTH CARE MAINTENANCE: Primary | ICD-10-CM

## 2025-02-05 DIAGNOSIS — Z87.898 HISTORY OF SEIZURES: ICD-10-CM

## 2025-02-05 LAB
ERYTHROCYTE [DISTWIDTH] IN BLOOD BY AUTOMATED COUNT: 12.3 % (ref 10–15)
HCT VFR BLD AUTO: 41 % (ref 40–53)
HGB BLD-MCNC: 14.4 G/DL (ref 13.3–17.7)
MCH RBC QN AUTO: 30.8 PG (ref 26.5–33)
MCHC RBC AUTO-ENTMCNC: 35.1 G/DL (ref 31.5–36.5)
MCV RBC AUTO: 88 FL (ref 78–100)
PLATELET # BLD AUTO: 196 10E3/UL (ref 150–450)
RBC # BLD AUTO: 4.68 10E6/UL (ref 4.4–5.9)
WBC # BLD AUTO: 5.2 10E3/UL (ref 4–11)

## 2025-02-05 PROCEDURE — 80053 COMPREHEN METABOLIC PANEL: CPT | Performed by: FAMILY MEDICINE

## 2025-02-05 PROCEDURE — 36415 COLL VENOUS BLD VENIPUNCTURE: CPT | Performed by: FAMILY MEDICINE

## 2025-02-05 PROCEDURE — 80061 LIPID PANEL: CPT | Performed by: FAMILY MEDICINE

## 2025-02-05 PROCEDURE — 84443 ASSAY THYROID STIM HORMONE: CPT | Performed by: FAMILY MEDICINE

## 2025-02-05 PROCEDURE — 85027 COMPLETE CBC AUTOMATED: CPT | Performed by: FAMILY MEDICINE

## 2025-02-05 PROCEDURE — 99395 PREV VISIT EST AGE 18-39: CPT | Performed by: FAMILY MEDICINE

## 2025-02-05 RX ORDER — CLOBAZAM 10 MG/1
10 TABLET ORAL AT BEDTIME
COMMUNITY
Start: 2025-01-23

## 2025-02-05 SDOH — HEALTH STABILITY: PHYSICAL HEALTH: ON AVERAGE, HOW MANY MINUTES DO YOU ENGAGE IN EXERCISE AT THIS LEVEL?: 10 MIN

## 2025-02-05 SDOH — HEALTH STABILITY: PHYSICAL HEALTH: ON AVERAGE, HOW MANY DAYS PER WEEK DO YOU ENGAGE IN MODERATE TO STRENUOUS EXERCISE (LIKE A BRISK WALK)?: 3 DAYS

## 2025-02-05 ASSESSMENT — SOCIAL DETERMINANTS OF HEALTH (SDOH): HOW OFTEN DO YOU GET TOGETHER WITH FRIENDS OR RELATIVES?: THREE TIMES A WEEK

## 2025-02-05 NOTE — PROGRESS NOTES
"Preventive Care Visit  [facility]  Agusto Rousseau MD, Family Medicine  Feb 5, 2025      Assessment & Plan     Health care maintenance  Patient here for annual exam  Presents with both his parents  Forms to be filled out for day program  Interested in labs today  - CBC with platelets  - Comprehensive metabolic panel (BMP + Alb, Alk Phos, ALT, AST, Total. Bili, TP)  - Lipid Profile (Chol, Trig, HDL, LDL calc)  - TSH with free T4 reflex  - TSH with free T4 reflex  - Lipid Profile (Chol, Trig, HDL, LDL calc)  - Comprehensive metabolic panel (BMP + Alb, Alk Phos, ALT, AST, Total. Bili, TP)  - CBC with platelets    Active autistic disorder  Following with neurology and psychiatry  Their psychiatrist is going to be retiring soon and they are hoping that I can take over some of their medications    History of seizures  Following with neurology and since adding in clobazam his seizure frequency has drastically diminished to almost negligible.  They are very happy with the change in status and are monitoring at this time.    Obesity  Discussed diet and exercise  Discussed how medications are likely contributing but working on healthy eating and regular activity will help maintain or lose weight and we will check some labs today to make sure there is not other etiologies such as thyroid contributing    Patient has been advised of split billing requirements and indicates understanding: Yes        BMI  Estimated body mass index is 30.76 kg/m  as calculated from the following:    Height as of this encounter: 1.683 m (5' 6.25\").    Weight as of this encounter: 87.1 kg (192 lb).   Weight management plan: Discussed healthy diet and exercise guidelines    Counseling  Appropriate preventive services were addressed with this patient via screening, questionnaire, or discussion as appropriate for fall prevention, nutrition, physical activity, Tobacco-use cessation, social engagement, weight loss and " cognition.  Checklist reviewing preventive services available has been given to the patient.  Reviewed patient's diet, addressing concerns and/or questions.   He is at risk for lack of exercise and has been provided with information to increase physical activity for the benefit of his well-being.           Riley Connelly is a 24 year old, presenting for the following:  Physical (Not fasting/) and Weight Problem (Weight gain- possibly from medications)        2/5/2025    12:29 PM   Additional Questions   Roomed by Stacy AUSTIN CMA   Accompanied by Parents          HPI  Exam  Presents with his parents today patient with history of autism and seizures.  Parents are concerned about his weight gain but has been steady and they realize of the medications are likely contributing but are very happy with the change of seizure frequency as since the addition of clobazam he is almost had very minimal seizures.  His psychiatrist will be retiring soon and they are hoping that we can take over some of his psych medications which will not be a problem.  We discussed diet and exercise.  We discussed different concerns of his to check foreign blood work to make sure we are not missing something that could be contributing to weight gain outside of his medications.  Patient participating in a day program.        Health Care Directive  Patient does not have a Health Care Directive: Discussed advance care planning with patient; however, patient declined at this time.      2/5/2025   General Health   How would you rate your overall physical health? Good   Feel stress (tense, anxious, or unable to sleep) Not at all         2/5/2025   Nutrition   Three or more servings of calcium each day? Yes   Diet: Regular (no restrictions)   How many servings of fruit and vegetables per day? (!) 2-3   How many sweetened beverages each day? (!) 2         2/5/2025   Exercise   Days per week of moderate/strenous exercise 3 days   Average minutes spent  "exercising at this level 10 min         2/5/2025   Social Factors   Frequency of gathering with friends or relatives Three times a week   Worry food won't last until get money to buy more No   Food not last or not have enough money for food? No   Do you have housing? (Housing is defined as stable permanent housing and does not include staying ouside in a car, in a tent, in an abandoned building, in an overnight shelter, or couch-surfing.) Yes   Are you worried about losing your housing? No   Lack of transportation? No   Unable to get utilities (heat,electricity)? No         2/5/2025   Dental   Dentist two times every year? Yes            Today's PHQ-2 Score:       2/5/2025    12:34 PM   PHQ-2 ( 1999 [employer])   Q1: Little interest or pleasure in doing things 0   Q2: Feeling down, depressed or hopeless 0   PHQ-2 Score 0           2/5/2025   Substance Use   Alcohol more than 3/day or more than 7/wk No   Do you use any other substances recreationally? (!) CANNABIS PRODUCTS    (!) PRESCRIPTION DRUGS       Multiple values from one day are sorted in reverse-chronological order     Social History     Tobacco Use    Smoking status: Never    Smokeless tobacco: Never             2/5/2025   One time HIV Screening   Previous HIV test? I don't know         2/5/2025   STI Screening   New sexual partner(s) since last STI/HIV test? No         2/5/2025   Contraception/Family Planning   Questions about contraception or family planning No        Reviewed and updated as needed this visit by Provider     Meds                         Objective    Exam  /76 (BP Location: Left arm, Patient Position: Sitting, Cuff Size: Adult Large)   Pulse 70   Temp 98.1  F (36.7  C) (Oral)   Ht 1.683 m (5' 6.25\")   Wt 87.1 kg (192 lb)   SpO2 98%   BMI 30.76 kg/m     Estimated body mass index is 30.76 kg/m  as calculated from the following:    Height as of this encounter: 1.683 m (5' 6.25\").    Weight as of this encounter: 87.1 kg (192 " lb).    Physical Exam  GENERAL: alert, no distress, occupied with his phone  EYES: Eyes grossly normal to inspection, PERRL and conjunctivae and sclerae normal  RESP: lungs clear to auscultation - no rales, rhonchi or wheezes  CV: regular rate and rhythm, normal S1 S2, no S3 or S4, no murmur, click or rub, no peripheral edema  ABDOMEN: bowel sounds normal  MS: no gross musculoskeletal defects noted, no edema  NEURO: Normal strength and tone, mentation intact and speech normal  PSYCH: appearance well groomed and very distracted by his phone likes watching videos-cooperative with exam      The longitudinal plan of care for the diagnosis(es)/condition(s) as documented were addressed during this visit. Due to the added complexity in care, I will continue to support Godwin in the subsequent management and with ongoing continuity of care.  Signed Electronically by: Agusto Rousseau MD

## 2025-02-06 LAB
ALBUMIN SERPL BCG-MCNC: 4.6 G/DL (ref 3.5–5.2)
ALP SERPL-CCNC: 87 U/L (ref 40–150)
ALT SERPL W P-5'-P-CCNC: 21 U/L (ref 0–70)
ANION GAP SERPL CALCULATED.3IONS-SCNC: 12 MMOL/L (ref 7–15)
AST SERPL W P-5'-P-CCNC: 23 U/L (ref 0–45)
BILIRUB SERPL-MCNC: 0.3 MG/DL
BUN SERPL-MCNC: 14.7 MG/DL (ref 6–20)
CALCIUM SERPL-MCNC: 9.2 MG/DL (ref 8.8–10.4)
CHLORIDE SERPL-SCNC: 108 MMOL/L (ref 98–107)
CHOLEST SERPL-MCNC: 202 MG/DL
CREAT SERPL-MCNC: 0.95 MG/DL (ref 0.67–1.17)
EGFRCR SERPLBLD CKD-EPI 2021: >90 ML/MIN/1.73M2
FASTING STATUS PATIENT QL REPORTED: NO
FASTING STATUS PATIENT QL REPORTED: NO
GLUCOSE SERPL-MCNC: 110 MG/DL (ref 70–99)
HCO3 SERPL-SCNC: 20 MMOL/L (ref 22–29)
HDLC SERPL-MCNC: 26 MG/DL
LDLC SERPL CALC-MCNC: 116 MG/DL
NONHDLC SERPL-MCNC: 176 MG/DL
POTASSIUM SERPL-SCNC: 3.9 MMOL/L (ref 3.4–5.3)
PROT SERPL-MCNC: 7.2 G/DL (ref 6.4–8.3)
SODIUM SERPL-SCNC: 140 MMOL/L (ref 135–145)
TRIGL SERPL-MCNC: 298 MG/DL
TSH SERPL DL<=0.005 MIU/L-ACNC: 0.63 UIU/ML (ref 0.3–4.2)